# Patient Record
Sex: FEMALE | Race: WHITE | Employment: FULL TIME | URBAN - METROPOLITAN AREA
[De-identification: names, ages, dates, MRNs, and addresses within clinical notes are randomized per-mention and may not be internally consistent; named-entity substitution may affect disease eponyms.]

---

## 2019-01-08 ENCOUNTER — HOSPITAL ENCOUNTER (OUTPATIENT)
Dept: NEUROLOGY | Age: 24
Discharge: HOME | End: 2019-01-08

## 2019-01-08 VITALS — BODY MASS INDEX: 28.64 KG/M2 | HEIGHT: 68 IN | WEIGHT: 189 LBS

## 2019-01-08 PROCEDURE — 95886 MUSC TEST DONE W/N TEST COMP: CPT

## 2019-01-08 PROCEDURE — 95913 NRV CNDJ TEST 13/> STUDIES: CPT

## 2019-04-25 ENCOUNTER — APPOINTMENT (OUTPATIENT)
Dept: GENERAL RADIOLOGY | Age: 24
End: 2019-04-25
Payer: COMMERCIAL

## 2019-04-25 ENCOUNTER — HOSPITAL ENCOUNTER (EMERGENCY)
Age: 24
Discharge: HOME | End: 2019-04-25
Attending: EMERGENCY MEDICINE
Payer: COMMERCIAL

## 2019-04-25 VITALS
WEIGHT: 190 LBS | RESPIRATION RATE: 16 BRPM | OXYGEN SATURATION: 99 % | HEART RATE: 94 BPM | TEMPERATURE: 98.4 F | SYSTOLIC BLOOD PRESSURE: 122 MMHG | HEIGHT: 69 IN | BODY MASS INDEX: 28.14 KG/M2 | DIASTOLIC BLOOD PRESSURE: 84 MMHG

## 2019-04-25 DIAGNOSIS — S90.31XA CONTUSION OF RIGHT FOOT, INITIAL ENCOUNTER: Primary | ICD-10-CM

## 2019-04-25 PROCEDURE — 6360000002 HC RX W HCPCS: Performed by: EMERGENCY MEDICINE

## 2019-04-25 PROCEDURE — 96372 THER/PROPH/DIAG INJ SC/IM: CPT

## 2019-04-25 PROCEDURE — 73630 X-RAY EXAM OF FOOT: CPT

## 2019-04-25 PROCEDURE — 99283 EMERGENCY DEPT VISIT LOW MDM: CPT

## 2019-04-25 RX ORDER — KETOROLAC TROMETHAMINE 30 MG/ML
60 INJECTION, SOLUTION INTRAMUSCULAR; INTRAVENOUS ONCE
Status: COMPLETED | OUTPATIENT
Start: 2019-04-25 | End: 2019-04-25

## 2019-04-25 RX ADMIN — KETOROLAC TROMETHAMINE 60 MG: 30 INJECTION, SOLUTION INTRAMUSCULAR; INTRAVENOUS at 22:31

## 2019-04-25 ASSESSMENT — PAIN DESCRIPTION - LOCATION
LOCATION: FOOT
LOCATION: FOOT

## 2019-04-25 ASSESSMENT — PAIN DESCRIPTION - ORIENTATION
ORIENTATION: RIGHT
ORIENTATION: RIGHT

## 2019-04-25 ASSESSMENT — PAIN DESCRIPTION - DESCRIPTORS
DESCRIPTORS: SHARP;ACHING
DESCRIPTORS: THROBBING;SHARP

## 2019-04-25 ASSESSMENT — PAIN SCALES - GENERAL
PAINLEVEL_OUTOF10: 8
PAINLEVEL_OUTOF10: 8
PAINLEVEL_OUTOF10: 6

## 2019-04-25 ASSESSMENT — PAIN DESCRIPTION - PAIN TYPE
TYPE: ACUTE PAIN
TYPE: ACUTE PAIN

## 2019-04-25 ASSESSMENT — PAIN DESCRIPTION - FREQUENCY: FREQUENCY: CONTINUOUS

## 2019-04-26 NOTE — ED PROVIDER NOTES
HPI:   Janae Leyden is a 20 y.o. female presenting to the ED for foot injury, beginning an hour ago. The complaint has been constant, moderate in severity, and worsened by walking. Pt was at work tonight when she dropped a heavy item onto her right foot. Pt took Tylenol PTA. Pt denies LOC, HA, SOB, CP, back pain, neck pain, n/v/d, fever, chills, dizziness, coughing, abdominal pain or any other general complaints. ROS:   Pertinent positives and negatives are stated within HPI, all other systems reviewed and are negative.    --------------------------------------------- PAST HISTORY ---------------------------------------------  Past Medical History:  has no past medical history on file. Past Surgical History:  has no past surgical history on file. Social History:  reports that she has never smoked. She has never used smokeless tobacco. She reports that she drank alcohol. She reports that she does not use drugs. Family History: family history is not on file. The patients home medications have been reviewed. Allergies: Morphine    -------------------------------------------------- RESULTS -------------------------------------------------  All laboratory and radiology results have been personally reviewed by myself   LABS:  No results found for this visit on 04/25/19. RADIOLOGY:  Interpreted by Radiologist.  XR FOOT RIGHT (MIN 3 VIEWS)    (Results Pending)       ------------------------- NURSING NOTES AND VITALS REVIEWED ---------------------------   The nursing notes within the ED encounter and vital signs as below have been reviewed.    /84   Pulse 94   Temp 98.4 °F (36.9 °C) (Oral)   Resp 16   Ht 5' 9\" (1.753 m)   Wt 190 lb (86.2 kg)   SpO2 99%   BMI 28.06 kg/m²   Oxygen Saturation Interpretation: Normal    ---------------------------------------------------PHYSICAL EXAM--------------------------------------    Constitutional/General: Alert and oriented x3, well appearing, non toxic in NAD  Head: NC/AT  Eyes: PERRL, EOMI  Mouth: Oropharynx clear, handling secretions, no trismus  Neck: Supple, full ROM,   Pulmonary: Lungs clear to auscultation bilaterally, no wheezes, rales, or rhonchi. Not in respiratory distress  Cardiovascular:  Regular rate and rhythm, no murmurs, gallops, or rubs. 2+ distal pulses  Abdomen: Soft, non tender, non distended,   Extremities: Moves all extremities x 4. Warm and well perfused. Right forefoot edemas, tender and erythemas  Skin: warm and dry   Neurologic: GCS 15,  Psych: Normal Affect      ------------------------------ ED COURSE/MEDICAL DECISION MAKING----------------------  Medications   ketorolac (TORADOL) injection 60 mg (has no administration in time range)       Medical Decision Making:    Pt is a 20year old female presenting for a foot injury that occurred at work. Pt reports that she dropped something heavy onto her right foot. She took Tylenol PTA. XR's were obtained and reviewed. Toradol given in the ED. Pt can be discharged home. Pt is agreeable and will be discharged home. She should follow up with her PCP in a couple of days and return if her symptoms worsen. Counseling: The emergency provider has spoken with the patient and discussed todays results, in addition to providing specific details for the plan of care and counseling regarding the diagnosis and prognosis. Questions are answered at this time and they are agreeable with the plan.      --------------------------------- IMPRESSION AND DISPOSITION ---------------------------------    IMPRESSION  No diagnosis found. DISPOSITION  Disposition: Discharge to home  Patient condition is stable    4/25/19, 9:43 PM.    This note is prepared by Natalia Quintana, acting as Scribe for Kasia Hussein DO. Kasia Hussein DO:  The scribe's documentation has been prepared under my direction and personally reviewed by me in its entirety.   I confirm that the note above accurately reflects all work, treatment, procedures, and medical decision making performed by me.          Mendez Mayer,   04/26/19 [id number]

## 2020-09-03 ENCOUNTER — APPOINTMENT (OUTPATIENT)
Dept: GENERAL RADIOLOGY | Age: 25
End: 2020-09-03
Payer: COMMERCIAL

## 2020-09-03 ENCOUNTER — HOSPITAL ENCOUNTER (EMERGENCY)
Age: 25
Discharge: HOME | End: 2020-09-03
Payer: COMMERCIAL

## 2020-09-03 VITALS
WEIGHT: 204 LBS | HEIGHT: 69 IN | HEART RATE: 92 BPM | DIASTOLIC BLOOD PRESSURE: 85 MMHG | SYSTOLIC BLOOD PRESSURE: 139 MMHG | BODY MASS INDEX: 30.21 KG/M2 | TEMPERATURE: 97.5 F | RESPIRATION RATE: 16 BRPM | OXYGEN SATURATION: 98 %

## 2020-09-03 PROCEDURE — 99283 EMERGENCY DEPT VISIT LOW MDM: CPT

## 2020-09-03 PROCEDURE — 6370000000 HC RX 637 (ALT 250 FOR IP): Performed by: NURSE PRACTITIONER

## 2020-09-03 PROCEDURE — 73140 X-RAY EXAM OF FINGER(S): CPT

## 2020-09-03 PROCEDURE — 2500000003 HC RX 250 WO HCPCS: Performed by: NURSE PRACTITIONER

## 2020-09-03 PROCEDURE — 90715 TDAP VACCINE 7 YRS/> IM: CPT | Performed by: NURSE PRACTITIONER

## 2020-09-03 PROCEDURE — 12001 RPR S/N/AX/GEN/TRNK 2.5CM/<: CPT

## 2020-09-03 PROCEDURE — 99284 EMERGENCY DEPT VISIT MOD MDM: CPT

## 2020-09-03 PROCEDURE — 90471 IMMUNIZATION ADMIN: CPT | Performed by: NURSE PRACTITIONER

## 2020-09-03 PROCEDURE — 6360000002 HC RX W HCPCS: Performed by: NURSE PRACTITIONER

## 2020-09-03 RX ORDER — LIDOCAINE HYDROCHLORIDE 10 MG/ML
20 INJECTION, SOLUTION INFILTRATION; PERINEURAL ONCE
Status: COMPLETED | OUTPATIENT
Start: 2020-09-03 | End: 2020-09-03

## 2020-09-03 RX ORDER — GINSENG 100 MG
CAPSULE ORAL
Status: DISCONTINUED
Start: 2020-09-03 | End: 2020-09-03 | Stop reason: HOSPADM

## 2020-09-03 RX ORDER — IBUPROFEN 800 MG/1
800 TABLET ORAL ONCE
Status: COMPLETED | OUTPATIENT
Start: 2020-09-03 | End: 2020-09-03

## 2020-09-03 RX ORDER — DIAPER,BRIEF,INFANT-TODD,DISP
EACH MISCELLANEOUS ONCE
Status: COMPLETED | OUTPATIENT
Start: 2020-09-03 | End: 2020-09-03

## 2020-09-03 RX ADMIN — BACITRACIN: 500 OINTMENT TOPICAL at 12:56

## 2020-09-03 RX ADMIN — LIDOCAINE HYDROCHLORIDE 20 ML: 10 INJECTION, SOLUTION INFILTRATION; PERINEURAL at 12:56

## 2020-09-03 RX ADMIN — IBUPROFEN 800 MG: 800 TABLET ORAL at 12:56

## 2020-09-03 RX ADMIN — TETANUS TOXOID, REDUCED DIPHTHERIA TOXOID AND ACELLULAR PERTUSSIS VACCINE, ADSORBED 0.5 ML: 5; 2.5; 8; 8; 2.5 SUSPENSION INTRAMUSCULAR at 12:56

## 2020-09-03 ASSESSMENT — PAIN SCALES - GENERAL
PAINLEVEL_OUTOF10: 3
PAINLEVEL_OUTOF10: 5

## 2020-09-03 NOTE — ED PROVIDER NOTES
ED Attending  CC: Yelena         Department of Emergency Medicine   ED  Provider Note  Admit Date/RoomTime: 9/3/2020 10:50 AM  ED Room: 34/34  Chief Complaint   Laceration (left index finger, needs tetanus shot, workers comp)    History of Present Illness   Source of history provided by:  patient. History/Exam Limitations: none. Vik Dempsey is a 30 y.o. old female presenting to the emergency department private vehicle alone for left index finger laceration that occurred at 10 AM at work today. She works at [employer] and she states she closed her finger  in a door and the latch caught her finger causing a laceration and states the finger wound not quit bleeding and she decided to come to the ED. The patients tetanus status is unknown. Bleeding is  controlled. There is pain at injury site. ROS    Pertinent positives and negatives are stated within HPI, all other systems reviewed and are negative. Past Medical History:  has no past medical history on file. Past Surgical History:  has no past surgical history on file. Social History:  reports that she has never smoked. She has never used smokeless tobacco. She reports previous alcohol use. She reports that she does not use drugs. Family History: family history is not on file. Allergies: Morphine    Physical Exam            ED Triage Vitals [09/03/20 1049]   BP Temp Temp Source Pulse Resp SpO2 Height Weight   139/85 97.5 °F (36.4 °C) Oral 92 16 98 % 5' 9\" (1.753 m) 204 lb (92.5 kg)      Oxygen Saturation Interpretation: Normal.    Constitutional:  Alert, development consistent with age. HEENT:  NC/NT. Airway patent. Neck:  Normal ROM. Supple. Non-tender. Digits:   Left Index finger distal phalanx volar aspect. Tenderness:  mild. .              Swelling: None. Deformity: no.             ROM: full range of motion. Skin:  2 cm simple laceration. Neurovascular:               Motor deficit: none; full flexion and extension at the mcp, pip and dip joint. Sensory deficit: none; full sensation intact to light touch in distal finger. .              Pulse deficit: none; 2 + radial pulse intact. .              Capillary refill: normal; less than 3 seconds in distal finger. Hand:              Tenderness:  none. Swelling: None. Deformity: no.             Skin:  no erythema, rash or wounds noted. Lymphatics: No lymphangitis or adenopathy noted. Neurological:  Oriented. Motor functions intact. Lab / Imaging Results   (All laboratory and radiology results have been personally reviewed by myself)  Labs:  No results found for this visit on 09/03/20. Imaging: All Radiology results interpreted by Radiologist unless otherwise noted. XR FINGER LEFT (MIN 2 VIEWS)   Final Result      1. Distal index finger soft tissue laceration. 2. A slight hyperextension at the second DIP joint. 3. No fracture. 4. No foreign body. ED Course / Medical Decision Making     Medications   ibuprofen (ADVIL;MOTRIN) tablet 800 mg (800 mg Oral Given 9/3/20 1256)   lidocaine 1 % injection 20 mL (20 mLs Intradermal Given 9/3/20 1256)   bacitracin zinc ointment ( Topical Given 9/3/20 1256)   Tetanus-Diphth-Acell Pertussis (BOOSTRIX) injection 0.5 mL (0.5 mLs Intramuscular Given 9/3/20 1256)            1420 Dr. Saba Finley into examine patient  . Consult(s):   None    Procedure(s):     PROCEDURE NOTE  9/3/20       Time: 2361    LACERATION REPAIR  Risks, benefits and alternatives (for applicable procedures below) described. Performed By: DEMARIO Nolen CNP. Laceration #: 1. Location: left index pulp of finger  Length: 2.5 cm. The wound area was irrigated with sterile saline, cleansend with shur-clens and draped in a sterile fashion. Local Anesthesia:  Lidocaine 1% without epinephrine.   The wound was explored with the following results:  no foreign body or tendon injury seen.  Debridement: subcutaneous. Undermining: None. Wound Margins Revised: None. Flaps Aligned: no. The wound was closed with 5-0 Prolene using interrupted sutures. Dressing:  bacitracin, a sterile dressing and finger splint was placed. Total number suture:  5. There were no additional lacerations requiring repair. Patient tolerated procedure well. MDM:   Simple laceration repair with no tendon involvement. Xray was negative for fracture. Tetanus updated today. She has full ROM with no neurologic or sensory deficits on examination. Advised on signs and symptoms warranting immediate return to the ED for re evaluation. Advised of follow up with [facility] care for suture removal in the next 7-10 days. Counseling: The emergency provider has spoken with the patient and discussed todays results, in addition to providing specific details for the plan of care and counseling regarding the diagnosis and prognosis. Questions are answered at this time and they are agreeable with the plan. Assessment     1. Laceration of left index finger without foreign body without damage to nail, initial encounter    2. Contusion of left index finger without damage to nail, initial encounter    3. Need for tetanus booster      Plan   Discharge to home  Patient condition is good    New Medications     There are no discharge medications for this patient. Electronically signed by DEMARIO Hui CNP   DD: 9/3/20  **This report was transcribed using voice recognition software. Every effort was made to ensure accuracy; however, inadvertent computerized transcription errors may be present.   END OF ED PROVIDER NOTE     DEMARIO Hui CNP  09/04/20 [id number]

## 2020-09-03 NOTE — ED NOTES
Wound cleansed with wound . Dressing applied. Pt tolerated well.      Domi Johnson RN  09/03/20 7833

## 2021-04-01 ENCOUNTER — IMMUNIZATION (OUTPATIENT)
Dept: PRIMARY CARE CLINIC | Age: 26
End: 2021-04-01

## 2021-04-01 PROCEDURE — 91300 COVID-19, PFIZER VACCINE 30MCG/0.3ML DOSE: CPT | Performed by: INTERNAL MEDICINE

## 2021-04-01 PROCEDURE — 0001A COVID-19, PFIZER VACCINE 30MCG/0.3ML DOSE: CPT | Performed by: INTERNAL MEDICINE

## 2021-04-27 ENCOUNTER — IMMUNIZATION (OUTPATIENT)
Dept: PRIMARY CARE CLINIC | Age: 26
End: 2021-04-27

## 2021-04-27 PROCEDURE — 91300 COVID-19, PFIZER VACCINE 30MCG/0.3ML DOSE: CPT | Performed by: INTERNAL MEDICINE

## 2021-04-27 PROCEDURE — 0002A COVID-19, PFIZER VACCINE 30MCG/0.3ML DOSE: CPT | Performed by: INTERNAL MEDICINE

## 2021-11-24 ENCOUNTER — APPOINTMENT (OUTPATIENT)
Dept: GENERAL RADIOLOGY | Age: 26
End: 2021-11-24
Payer: COMMERCIAL

## 2021-11-24 ENCOUNTER — HOSPITAL ENCOUNTER (EMERGENCY)
Age: 26
Discharge: HOME | End: 2021-11-24
Payer: COMMERCIAL

## 2021-11-24 VITALS
OXYGEN SATURATION: 98 % | TEMPERATURE: 98.1 F | BODY MASS INDEX: 31.95 KG/M2 | DIASTOLIC BLOOD PRESSURE: 72 MMHG | RESPIRATION RATE: 18 BRPM | HEIGHT: 67 IN | SYSTOLIC BLOOD PRESSURE: 134 MMHG | HEART RATE: 86 BPM

## 2021-11-24 DIAGNOSIS — S93.401A SPRAIN OF RIGHT ANKLE, UNSPECIFIED LIGAMENT, INITIAL ENCOUNTER: Primary | ICD-10-CM

## 2021-11-24 PROCEDURE — 99283 EMERGENCY DEPT VISIT LOW MDM: CPT

## 2021-11-24 PROCEDURE — 73610 X-RAY EXAM OF ANKLE: CPT

## 2021-11-24 PROCEDURE — 73630 X-RAY EXAM OF FOOT: CPT

## 2021-11-24 PROCEDURE — 6370000000 HC RX 637 (ALT 250 FOR IP): Performed by: PHYSICIAN ASSISTANT

## 2021-11-24 PROCEDURE — 73590 X-RAY EXAM OF LOWER LEG: CPT

## 2021-11-24 RX ORDER — ACETAMINOPHEN 500 MG
1000 TABLET ORAL ONCE
Status: COMPLETED | OUTPATIENT
Start: 2021-11-24 | End: 2021-11-24

## 2021-11-24 RX ADMIN — ACETAMINOPHEN 1000 MG: 500 TABLET ORAL at 16:23

## 2021-11-24 ASSESSMENT — PAIN DESCRIPTION - DESCRIPTORS: DESCRIPTORS: THROBBING

## 2021-11-24 ASSESSMENT — PAIN SCALES - GENERAL
PAINLEVEL_OUTOF10: 7
PAINLEVEL_OUTOF10: 7

## 2021-11-24 ASSESSMENT — PAIN DESCRIPTION - FREQUENCY: FREQUENCY: INTERMITTENT

## 2021-11-24 ASSESSMENT — PAIN DESCRIPTION - PAIN TYPE: TYPE: ACUTE PAIN

## 2021-11-24 ASSESSMENT — PAIN DESCRIPTION - LOCATION: LOCATION: ANKLE

## 2021-11-24 ASSESSMENT — PAIN DESCRIPTION - ORIENTATION: ORIENTATION: RIGHT

## 2021-11-24 NOTE — ED PROVIDER NOTES
[facility]  Department of Emergency Medicine   ED  Encounter Note  Admit Date/RoomTime: 2021  3:32 PM  ED Room: FOSTER/FOSTER    NAME: Sheela Adames  : 1995  MRN: [id number]     Chief Complaint:  Fall (fall from steps with right ankle pain )    History of Present Illness         Sheela Adames is a 27 y.o. old female presenting to the emergency department by private vehicle, for persistent right ankle pain after falling down the stairs at work today. Patient states she slipped and fell down a few steps in her right foot stayed above her. Patient denies hitting her head, LOC, any other injury during the fall. Patient states her symptoms are mild in severity describes as a throbbing pain. Patient states the pain is worse with ambulation. Patient denies anything make it better. Patient states she had a previous ankle sprain but denies previous fracture. Denies fever/chills, headache, vision change, dizziness, chest pain, dyspnea, abdominal pain, NVD, numbness/weakness. ROS   Pertinent positives and negatives are stated within HPI, all other systems reviewed and are negative. Past Medical History:  has a past medical history of Cerebral palsy ([facility] [address] Surgical History:  has a past surgical history that includes Appendectomy. Social History:  reports that she has never smoked. She has never used smokeless tobacco. She reports current alcohol use. She reports that she does not use drugs. Family History: family history is not on file. Allergies: Morphine    Physical Exam   Oxygen Saturation Interpretation: Normal.        ED Triage Vitals   BP Temp Temp Source Pulse Resp SpO2 Height Weight   21 1558 21 1532 21 1532 21 1532 21 1558 21 1532 21 1559 --   (!) 147/76 97.7 °F (36.5 °C) Oral 88 18 98 % 5' 7\" (1.702 m)          Constitutional:  Alert, development consistent with age. Neck:  Normal ROM. Supple. Right Ankle: Lateral malleolus              Tenderness:  mild. Swelling: Mild. Deformity: no deformity observed/palpated. ROM: diminished range with pain. Skin:  no wounds, erythema, or swelling. Neurovascular: Motor deficit: none. Sensory deficit:   none. Pulse deficit: none. Capillary refill: normal.  Right Knee:              Tenderness:  none. Swelling: None. Deformity: no deformity observed/palpated. ROM: full range of motion. Skin:  no wounds, erythema, or swelling. Right Foot:              Tenderness:  none. Swelling: None. Deformity: no deformity observed/palpated. ROM: full range of motion. Skin:  no wounds, erythema, or swelling  Gait:  Seated in wheelchair during exam.    Lymphatics: No lymphangitis or adenopathy noted. Neurological:  Oriented. Motor functions intact. Lab / Imaging Results   (All laboratory and radiology results have been personally reviewed by myself)  Labs:  No results found for this visit on 11/24/21. Imaging: All Radiology results interpreted by Radiologist unless otherwise noted. XR ANKLE RIGHT (MIN 3 VIEWS)   Final Result   1. No acute osseous findings about the right tibia/fibula, right ankle, nor   right foot. Lateral malleolar soft tissue swelling. No joint effusion. 2.  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 RECOMMENDATION:   In the setting of trauma, if there is persistent symptoms and physical exam   warrants a repeat radiograph in 10-14 days could be considered as occult   fractures may not be evident on initial imaging evaluation. XR FOOT RIGHT (MIN 3 VIEWS)   Final Result   1. No acute osseous findings about the right tibia/fibula, right ankle, nor   right foot. Lateral malleolar soft tissue swelling.   No joint effusion. 2.  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 RECOMMENDATION:   In the setting of trauma, if there is persistent symptoms and physical exam   warrants a repeat radiograph in 10-14 days could be considered as occult   fractures may not be evident on initial imaging evaluation. XR TIBIA FIBULA RIGHT (2 VIEWS)   Final Result   1. No acute osseous findings about the right tibia/fibula, right ankle, nor   right foot. Lateral malleolar soft tissue swelling. No joint effusion. 2.  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 RECOMMENDATION:   In the setting of trauma, if there is persistent symptoms and physical exam   warrants a repeat radiograph in 10-14 days could be considered as occult   fractures may not be evident on initial imaging evaluation. ED Course / Medical Decision Making     Medications   acetaminophen (TYLENOL) tablet 1,000 mg (1,000 mg Oral Given 11/24/21 1623)        Consults:   None    Procedure(s):  None    MDM:   Patient presenting after fall. Patient is in no acute distress, afebrile, nontoxic appearance. Patient's x-rays negative for acute findings. Patient will be given an Aircast and crutches recommend follow-up with PCP and possibly orthopedics. Discussed supportive care. Recommend patient return to the ED with new or worsening of symptoms. Plan of Care/Counseling:  Rusty Cartagena PA-C reviewed today's visit with the patient in addition to providing specific details for the plan of care and counseling regarding the diagnosis and prognosis. Questions are answered at this time and are agreeable with the plan. Assessment      1. Sprain of right ankle, unspecified ligament, initial encounter      Plan   Discharged home.   Patient condition is stable    New Medications     New Prescriptions    No medications on file     Electronically signed by Rusty Cartagena PA-C   DD: 11/24/21  **This report was transcribed using voice recognition software. Every effort was made to ensure accuracy; however, inadvertent computerized transcription errors may be present.   END OF ED PROVIDER NOTE     Alize De Jesus PA-C  11/24/21 [id number]

## 2021-11-30 ENCOUNTER — TELEPHONE (OUTPATIENT)
Dept: ADMINISTRATIVE | Age: 26
End: 2021-11-30

## 2021-11-30 DIAGNOSIS — S93.401A SPRAIN OF RIGHT ANKLE, UNSPECIFIED LIGAMENT, INITIAL ENCOUNTER: Primary | ICD-10-CM

## 2021-11-30 NOTE — TELEPHONE ENCOUNTER
Pt was seen in the [facility] ED on 11/24 for: Sprain of right ankle, unspecified ligament, initial encounter    She called to schedule her follow up with Dr Carla Hall.

## 2021-11-30 NOTE — TELEPHONE ENCOUNTER
Ye on call/ED on 11/24 for: Sprain of right ankle, unspecified ligament, initial encounter  Chief Complaint:  Fall (fall from steps with right ankle pain )  Imaging: All Radiology results interpreted by Radiologist unless otherwise noted. XR ANKLE RIGHT (MIN 3 VIEWS)   Final Result   1. No acute osseous findings about the right tibia/fibula, right ankle, nor   right foot. Lateral malleolar soft tissue swelling. No joint effusion.       2. 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       RECOMMENDATION:   In the setting of trauma, if there is persistent symptoms and physical exam   warrants a repeat radiograph in 10-14 days could be considered as occult   fractures may not be evident on initial imaging evaluation.           XR FOOT RIGHT (MIN 3 VIEWS)   Final Result   1. No acute osseous findings about the right tibia/fibula, right ankle, nor   right foot. Lateral malleolar soft tissue swelling. No joint effusion.       2. 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       RECOMMENDATION:   In the setting of trauma, if there is persistent symptoms and physical exam   warrants a repeat radiograph in 10-14 days could be considered as occult   fractures may not be evident on initial imaging evaluation.           XR TIBIA FIBULA RIGHT (2 VIEWS)   Final Result   1. No acute osseous findings about the right tibia/fibula, right ankle, nor   right foot. Lateral malleolar soft tissue swelling. No joint effusion.       2. 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       RECOMMENDATION:   In the setting of trauma, if there is persistent symptoms and physical exam   warrants a repeat radiograph in 10-14 days could be considered as occult   fractures may not be evident on initial imaging evaluation. Routing to providers for scheduling rec.

## 2021-11-30 NOTE — TELEPHONE ENCOUNTER
Call to pt advised provider reviewed ED records and referred to [facility] / Dr. Vivas Press IM#[phone]. They will contact to schedule appt.

## 2021-12-02 ENCOUNTER — TELEPHONE (OUTPATIENT)
Dept: SPORTS MEDICINE | Age: 26
End: 2021-12-02

## 2021-12-03 ENCOUNTER — TELEPHONE (OUTPATIENT)
Dept: SPORTS MEDICINE | Age: 26
End: 2021-12-03

## 2021-12-22 ENCOUNTER — TELEPHONE (OUTPATIENT)
Dept: SPORTS MEDICINE | Age: 26
End: 2021-12-22

## 2021-12-22 NOTE — TELEPHONE ENCOUNTER
Dr. Chilo Villegas does not see workers comp. Our  is not staffed for workers comp. Any questions please call [phone].

## 2023-08-02 ENCOUNTER — HOSPITAL ENCOUNTER (OUTPATIENT)
Age: 28
Discharge: HOME | End: 2023-08-04

## 2023-08-07 LAB — SURGICAL PATHOLOGY REPORT: NORMAL
